# Patient Record
Sex: FEMALE | Race: WHITE | Employment: FULL TIME | URBAN - NONMETROPOLITAN AREA
[De-identification: names, ages, dates, MRNs, and addresses within clinical notes are randomized per-mention and may not be internally consistent; named-entity substitution may affect disease eponyms.]

---

## 2024-01-29 ENCOUNTER — OFFICE VISIT (OUTPATIENT)
Dept: FAMILY MEDICINE CLINIC | Age: 49
End: 2024-01-29
Payer: COMMERCIAL

## 2024-01-29 VITALS
OXYGEN SATURATION: 100 % | HEART RATE: 87 BPM | WEIGHT: 127.8 LBS | HEIGHT: 61 IN | BODY MASS INDEX: 24.13 KG/M2 | SYSTOLIC BLOOD PRESSURE: 122 MMHG | DIASTOLIC BLOOD PRESSURE: 68 MMHG

## 2024-01-29 DIAGNOSIS — M79.672 LEFT FOOT PAIN: Primary | ICD-10-CM

## 2024-01-29 DIAGNOSIS — T14.8XXA CONTUSION OF BONE: ICD-10-CM

## 2024-01-29 PROCEDURE — 99203 OFFICE O/P NEW LOW 30 MIN: CPT | Performed by: FAMILY MEDICINE

## 2024-01-29 SDOH — ECONOMIC STABILITY: HOUSING INSECURITY
IN THE LAST 12 MONTHS, WAS THERE A TIME WHEN YOU DID NOT HAVE A STEADY PLACE TO SLEEP OR SLEPT IN A SHELTER (INCLUDING NOW)?: NO

## 2024-01-29 SDOH — ECONOMIC STABILITY: INCOME INSECURITY: HOW HARD IS IT FOR YOU TO PAY FOR THE VERY BASICS LIKE FOOD, HOUSING, MEDICAL CARE, AND HEATING?: NOT HARD AT ALL

## 2024-01-29 SDOH — ECONOMIC STABILITY: FOOD INSECURITY: WITHIN THE PAST 12 MONTHS, YOU WORRIED THAT YOUR FOOD WOULD RUN OUT BEFORE YOU GOT MONEY TO BUY MORE.: NEVER TRUE

## 2024-01-29 SDOH — ECONOMIC STABILITY: FOOD INSECURITY: WITHIN THE PAST 12 MONTHS, THE FOOD YOU BOUGHT JUST DIDN'T LAST AND YOU DIDN'T HAVE MONEY TO GET MORE.: NEVER TRUE

## 2024-01-29 ASSESSMENT — PATIENT HEALTH QUESTIONNAIRE - PHQ9
2. FEELING DOWN, DEPRESSED OR HOPELESS: 0
SUM OF ALL RESPONSES TO PHQ QUESTIONS 1-9: 0
1. LITTLE INTEREST OR PLEASURE IN DOING THINGS: 0
SUM OF ALL RESPONSES TO PHQ QUESTIONS 1-9: 0
SUM OF ALL RESPONSES TO PHQ9 QUESTIONS 1 & 2: 0

## 2024-01-29 ASSESSMENT — ENCOUNTER SYMPTOMS: BACK PAIN: 0

## 2024-01-29 NOTE — PROGRESS NOTES
[facility] SPECIALTY/PRIMARY CARE  [address] [facility], [address]  [facility] [address]  Dept: [phone]  Dept Fax: [phone]  Loc: [phone]     1/29/2024    Visit type: New patient    Reason for Visit: Foot Injury (Pt states she had a fall last Saturday and hurt her lateral Lt foot. States dull ache 5/10 only when she walks, had some swelling but gotten better, decreased ROM on her Lt foot. Denies extending pain, bruises, numbness or tingling sensation.)         Patient: Marija Gallardo is a 48 y.o. female     HPI: 48-year-old female presents with left foot pain after traumatic fall on Saturday.  Patient tripped over her cat at night, states the pain has continued though the swelling is reduced.  Patient does report having some decreased range of motion, denies any numbness or tingling.    ASSESSMENT/PLAN   1. Left foot pain  -     XR FOOT LEFT (MIN 3 VIEWS); Future  -     [employer] Walker Boot, Air Select Low Top, Left; S (M4-7/F4-6)  2. Contusion of bone  -     [employer] Walker Boot, Air Select Low Top, Left; S (M4-7/F4-6)  -Discussed use of acetaminophen or anti-inflammatory as needed  -Discussed activity modification, discussed use of ice as needed  -X-ray was reviewed, consideration for faint line consistent with possible fracture at the site of pain.  I will treat with a short boot to reduce pain with ambulation and protection, follow-up in 2 weeks with an x-ray to look for any changes or if there is an obvious fracture    No orders of the defined types were placed in this encounter.       Subjective      Review of Systems   Musculoskeletal:  Positive for gait problem and myalgias. Negative for arthralgias, back pain, joint swelling and neck pain.        Left foot pain   Skin:  Negative for wound.   Neurological:  Negative for dizziness, weakness and numbness.      No Known Allergies  No current outpatient medications on

## 2024-02-13 ENCOUNTER — OFFICE VISIT (OUTPATIENT)
Dept: FAMILY MEDICINE CLINIC | Age: 49
End: 2024-02-13
Payer: COMMERCIAL

## 2024-02-13 VITALS
SYSTOLIC BLOOD PRESSURE: 104 MMHG | HEIGHT: 61 IN | DIASTOLIC BLOOD PRESSURE: 70 MMHG | HEART RATE: 121 BPM | OXYGEN SATURATION: 100 % | BODY MASS INDEX: 23.98 KG/M2 | WEIGHT: 127 LBS

## 2024-02-13 DIAGNOSIS — S92.352D CLOSED FRACTURE OF BASE OF FIFTH METATARSAL BONE OF LEFT FOOT WITH ROUTINE HEALING: Primary | ICD-10-CM

## 2024-02-13 PROCEDURE — 99213 OFFICE O/P EST LOW 20 MIN: CPT | Performed by: FAMILY MEDICINE

## 2024-02-13 NOTE — PROGRESS NOTES
[facility] SPECIALTY/PRIMARY CARE  [address], [address]  [facility] [address]  Dept: [phone]  Dept Fax: [phone]  Loc: [phone]     2/13/2024    Visit type: Follow up    Reason for Visit: Follow-up (2 wks f/u Left foot pain. Pt states there's a little improvement since LOV, pain 5/10 when walking and doing activities, minimal swelling on lateral Lt foot. Denies extending pain. ) and Health Maintenance (Declined Health maintenance. )         Patient: Marija Gallardo is a 48 y.o. female   HPI: 48-year-old female presents for follow-up visit pertaining to left lateral foot pain.  Patient had an x-ray in the previous encounter, I did feel there was a fracture line visible though the radiologist read as negative.  I ordered a repeat x-ray today and reviewed with the patient.  Patient continues to have significant pain of the lateral left foot at the base of the fifth metatarsal.    ASSESSMENT/PLAN   1. Closed fracture of base of fifth metatarsal bone of left foot with routine healing  -     XR FOOT LEFT (MIN 3 VIEWS); Future  -Tenderness to palpation at the base of the fifth metatarsal, pain with resistance to external rotation.  No swelling or bruising at this time.  Patient does have a boot, she has been wearing it intermittently, I recommended she wear the boot at all times for weightbearing.  -Discussed use of acetaminophen as needed.  -X-ray was reviewed which does show fracture line, feel both x-rays do show a fracture at the base of the fifth metatarsal, localized to the border of zone 2 and zone 3.  -Discussed with patient the increased incidence of nonunion and is now, patient understood  -Continue with walking boot, follow-up in 4 weeks with x-ray    No orders of the defined types were placed in this encounter.       Treatment plan/ rationale and result expectations have been discussed with the patient who expresses understanding and desires to

## 2024-02-14 ENCOUNTER — TELEPHONE (OUTPATIENT)
Dept: FAMILY MEDICINE CLINIC | Age: 49
End: 2024-02-14

## 2024-02-14 NOTE — TELEPHONE ENCOUNTER
Pt called stating if she could exchanged her DME boot to medium size due to her toes are hanging from the boot.     [facility]

## 2024-02-14 NOTE — TELEPHONE ENCOUNTER
[facility], he states pt could come in to exchanged her boot to size medium. I asked him if I needed to create a new DME form for the pt, he states no need to make a new one and pt could come in to get her boot and leave the

## 2024-03-12 ENCOUNTER — OFFICE VISIT (OUTPATIENT)
Dept: FAMILY MEDICINE CLINIC | Age: 49
End: 2024-03-12
Payer: COMMERCIAL

## 2024-03-12 VITALS
HEIGHT: 61 IN | BODY MASS INDEX: 24.55 KG/M2 | WEIGHT: 130 LBS | OXYGEN SATURATION: 100 % | HEART RATE: 101 BPM | SYSTOLIC BLOOD PRESSURE: 108 MMHG | DIASTOLIC BLOOD PRESSURE: 60 MMHG

## 2024-03-12 DIAGNOSIS — S92.352D CLOSED FRACTURE OF BASE OF FIFTH METATARSAL BONE OF LEFT FOOT WITH ROUTINE HEALING: Primary | ICD-10-CM

## 2024-03-12 PROCEDURE — 99213 OFFICE O/P EST LOW 20 MIN: CPT | Performed by: FAMILY MEDICINE

## 2024-03-12 ASSESSMENT — ENCOUNTER SYMPTOMS: BACK PAIN: 0
